# Patient Record
Sex: MALE | Race: WHITE | URBAN - METROPOLITAN AREA
[De-identification: names, ages, dates, MRNs, and addresses within clinical notes are randomized per-mention and may not be internally consistent; named-entity substitution may affect disease eponyms.]

---

## 2018-11-08 ENCOUNTER — HOSPITAL ENCOUNTER (EMERGENCY)
Facility: CLINIC | Age: 23
Discharge: HOME | End: 2018-11-08
Attending: EMERGENCY MEDICINE | Admitting: EMERGENCY MEDICINE
Payer: COMMERCIAL

## 2018-11-08 VITALS
SYSTOLIC BLOOD PRESSURE: 135 MMHG | BODY MASS INDEX: 23.19 KG/M2 | DIASTOLIC BLOOD PRESSURE: 72 MMHG | OXYGEN SATURATION: 98 % | RESPIRATION RATE: 15 BRPM | WEIGHT: 175 LBS | TEMPERATURE: 97.8 F | HEIGHT: 73 IN

## 2018-11-08 DIAGNOSIS — E16.0 HYPOGLYCEMIC REACTION TO INSULIN: ICD-10-CM

## 2018-11-08 DIAGNOSIS — T38.3X5A HYPOGLYCEMIC REACTION TO INSULIN: ICD-10-CM

## 2018-11-08 LAB
ALBUMIN UR-MCNC: NEGATIVE MG/DL
ANION GAP SERPL CALCULATED.3IONS-SCNC: 6 MMOL/L (ref 3–14)
APPEARANCE UR: CLEAR
BASOPHILS # BLD AUTO: 0 10E9/L (ref 0–0.2)
BASOPHILS NFR BLD AUTO: 0.1 %
BILIRUB UR QL STRIP: NEGATIVE
BUN SERPL-MCNC: 20 MG/DL (ref 7–30)
CALCIUM SERPL-MCNC: 9.7 MG/DL (ref 8.5–10.1)
CHLORIDE SERPL-SCNC: 101 MMOL/L (ref 94–109)
CO2 SERPL-SCNC: 29 MMOL/L (ref 20–32)
COLOR UR AUTO: ABNORMAL
CREAT SERPL-MCNC: 0.97 MG/DL (ref 0.66–1.25)
DIFFERENTIAL METHOD BLD: NORMAL
EOSINOPHIL # BLD AUTO: 0 10E9/L (ref 0–0.7)
EOSINOPHIL NFR BLD AUTO: 0.3 %
ERYTHROCYTE [DISTWIDTH] IN BLOOD BY AUTOMATED COUNT: 11.6 % (ref 10–15)
GFR SERPL CREATININE-BSD FRML MDRD: >90 ML/MIN/1.7M2
GLUCOSE BLDC GLUCOMTR-MCNC: 229 MG/DL (ref 70–99)
GLUCOSE BLDC GLUCOMTR-MCNC: 272 MG/DL (ref 70–99)
GLUCOSE BLDC GLUCOMTR-MCNC: 292 MG/DL (ref 70–99)
GLUCOSE BLDC GLUCOMTR-MCNC: 51 MG/DL (ref 70–99)
GLUCOSE BLDC GLUCOMTR-MCNC: 57 MG/DL (ref 70–99)
GLUCOSE SERPL-MCNC: 49 MG/DL (ref 70–99)
GLUCOSE UR STRIP-MCNC: 300 MG/DL
HCT VFR BLD AUTO: 47.4 % (ref 40–53)
HGB BLD-MCNC: 17.2 G/DL (ref 13.3–17.7)
HGB UR QL STRIP: NEGATIVE
IMM GRANULOCYTES # BLD: 0 10E9/L (ref 0–0.4)
IMM GRANULOCYTES NFR BLD: 0.2 %
KETONES UR STRIP-MCNC: NEGATIVE MG/DL
LEUKOCYTE ESTERASE UR QL STRIP: NEGATIVE
LYMPHOCYTES # BLD AUTO: 0.9 10E9/L (ref 0.8–5.3)
LYMPHOCYTES NFR BLD AUTO: 9.8 %
MCH RBC QN AUTO: 31.4 PG (ref 26.5–33)
MCHC RBC AUTO-ENTMCNC: 36.3 G/DL (ref 31.5–36.5)
MCV RBC AUTO: 87 FL (ref 78–100)
MONOCYTES # BLD AUTO: 0.8 10E9/L (ref 0–1.3)
MONOCYTES NFR BLD AUTO: 8.9 %
NEUTROPHILS # BLD AUTO: 7.6 10E9/L (ref 1.6–8.3)
NEUTROPHILS NFR BLD AUTO: 80.7 %
NITRATE UR QL: NEGATIVE
NRBC # BLD AUTO: 0 10*3/UL
NRBC BLD AUTO-RTO: 0 /100
PH UR STRIP: 7 PH (ref 5–7)
PLATELET # BLD AUTO: 255 10E9/L (ref 150–450)
POTASSIUM SERPL-SCNC: 3.3 MMOL/L (ref 3.4–5.3)
RBC # BLD AUTO: 5.48 10E12/L (ref 4.4–5.9)
SODIUM SERPL-SCNC: 136 MMOL/L (ref 133–144)
SOURCE: ABNORMAL
SP GR UR STRIP: 1.01 (ref 1–1.03)
UROBILINOGEN UR STRIP-MCNC: NORMAL MG/DL (ref 0–2)
WBC # BLD AUTO: 9.5 10E9/L (ref 4–11)

## 2018-11-08 PROCEDURE — 96374 THER/PROPH/DIAG INJ IV PUSH: CPT

## 2018-11-08 PROCEDURE — 99284 EMERGENCY DEPT VISIT MOD MDM: CPT | Mod: 25

## 2018-11-08 PROCEDURE — 96361 HYDRATE IV INFUSION ADD-ON: CPT

## 2018-11-08 PROCEDURE — 96376 TX/PRO/DX INJ SAME DRUG ADON: CPT

## 2018-11-08 PROCEDURE — 25800025 ZZH RX 258: Performed by: EMERGENCY MEDICINE

## 2018-11-08 PROCEDURE — 80048 BASIC METABOLIC PNL TOTAL CA: CPT | Performed by: EMERGENCY MEDICINE

## 2018-11-08 PROCEDURE — 25000128 H RX IP 250 OP 636: Performed by: EMERGENCY MEDICINE

## 2018-11-08 PROCEDURE — 00000146 ZZHCL STATISTIC GLUCOSE BY METER IP

## 2018-11-08 PROCEDURE — 85025 COMPLETE CBC W/AUTO DIFF WBC: CPT | Performed by: EMERGENCY MEDICINE

## 2018-11-08 PROCEDURE — 81003 URINALYSIS AUTO W/O SCOPE: CPT | Performed by: EMERGENCY MEDICINE

## 2018-11-08 PROCEDURE — 25000128 H RX IP 250 OP 636

## 2018-11-08 RX ORDER — ONDANSETRON 2 MG/ML
4 INJECTION INTRAMUSCULAR; INTRAVENOUS ONCE
Status: COMPLETED | OUTPATIENT
Start: 2018-11-08 | End: 2018-11-08

## 2018-11-08 RX ORDER — DEXTROSE MONOHYDRATE 25 G/50ML
50 INJECTION, SOLUTION INTRAVENOUS ONCE
Status: COMPLETED | OUTPATIENT
Start: 2018-11-08 | End: 2018-11-08

## 2018-11-08 RX ORDER — ONDANSETRON 4 MG/1
4 TABLET, ORALLY DISINTEGRATING ORAL EVERY 8 HOURS PRN
Qty: 15 TABLET | Refills: 0 | Status: SHIPPED | OUTPATIENT
Start: 2018-11-08 | End: 2018-11-11

## 2018-11-08 RX ORDER — ONDANSETRON 2 MG/ML
INJECTION INTRAMUSCULAR; INTRAVENOUS
Status: COMPLETED
Start: 2018-11-08 | End: 2018-11-08

## 2018-11-08 RX ADMIN — DEXTROSE MONOHYDRATE 50 ML: 25 INJECTION, SOLUTION INTRAVENOUS at 16:44

## 2018-11-08 RX ADMIN — SODIUM CHLORIDE 1000 ML: 9 INJECTION, SOLUTION INTRAVENOUS at 16:49

## 2018-11-08 RX ADMIN — ONDANSETRON 4 MG: 2 INJECTION INTRAMUSCULAR; INTRAVENOUS at 17:53

## 2018-11-08 RX ADMIN — ONDANSETRON 4 MG: 2 INJECTION INTRAMUSCULAR; INTRAVENOUS at 20:18

## 2018-11-08 ASSESSMENT — ENCOUNTER SYMPTOMS
HEADACHES: 0
NAUSEA: 1
CHILLS: 1
VOMITING: 1
FEVER: 0
DIAPHORESIS: 0
PALPITATIONS: 1
ABDOMINAL PAIN: 0
TREMORS: 1

## 2018-11-08 NOTE — ED AVS SNAPSHOT
Emergency Department    [address] [facility] [address]    Phone:  [phone]    Fax:  [phone]                                       Ronny Degroot   MRN: [id number]    Department:   Emergency Department   Date of Visit:  11/8/2018           Patient Information     Date Of Birth          1995        Your diagnoses for this visit were:     Hypoglycemic reaction to insulin        You were seen by Michele Lara MD.      Follow-up Information     Follow up with Endocrinologist. Schedule an appointment as soon as possible for a visit in 3 days.    Why:  For close follow up    Contact information:    Follow up with your endocrinologist        Go to  Emergency Department.    Specialty:  EMERGENCY MEDICINE    Why:  If symptoms worsen    Contact information:    [address] [facility] [address] [phone]        Discharge Instructions         Decrease your insulin back to your prior dosing (38 units of long acting at night, and 1 unit/10g of carbs of short acting with meals). Monitor your blood sugar closely. Keep the short acting the same and then slowly increase your long acting insulin one unit at a time (tomorrow increase to 39 units, if your sugars do not get too low then increase by 1 more the next day to 40 units). Follow up with your endocrinologist before increasing your short term insulin.    Hypoglycemia (Low Blood Sugar)     Fast-acting sugar includes a cup of nonfat milk.   Too little sugar (glucose) in your blood is called hypoglycemia or low blood sugar. Low blood sugar usually means anything lower than 70 mg/dL. Talk with your healthcare provider about your target range and what level is too low for you. Diabetes itself doesn t cause low blood sugar. But some of the treatments for diabetes, such as pills or insulin, may raise your risk for it. Low blood sugar may cause you to pass out or have a seizure. So always treat low blood sugar right away, but  don't overeat.  Special note: Always carry a source of fast-acting sugar and a snack in case of hypoglycemia.   What you may notice  If you have low blood sugar, you may have one or more of these symptoms:    Shakiness or dizziness    Cold, clammy skin or sweating    Feelings of hunger    Headache    Nervousness    A hard, fast heartbeat    Weakness    Confusion or irritability    Blurred vision    Having nightmares or waking up confused or sweating    Numbness or tingling in the lips or tongue  What you should do  Here are tips to follow if you have hypoglycemia:     First check your blood sugar. If it is too low (out of your target range), eat or drink 15 to 20 grams of fast-acting sugar. This may be 3 to 4 glucose tablets, 4 ounces (half a cup) of fruit juice or regular (nondiet) soda, 8 ounces (1 cup) of fat-free milk, or 1 tablespoon of honey. Don t take more than this, or your blood sugar may go too high.    Wait 15 minutes. Then recheck your blood sugar if you can.    If your blood sugar is still too low, repeat the steps above and check your blood sugar again. If your blood sugar still has not returned to your target range, contact your healthcare provider or seek emergency care.    Once your blood sugar returns to target range, eat a snack or meal.  Preventing low blood sugar  Things you can do include the following:     If your condition needs a strict treatment plan, eat your meals and snacks at the same times each day. Don t skip meals!    If your treatment plan lets you change when you eat and what you eat, learn how to change the time and dose of your rapid-acting insulin to match this.     Ask your healthcare provider if it is safe for you to drink alcohol. Never drink on an empty stomach.    Take your medicine at the prescribed times.    Always carry a source of fast-acting sugar and a snack when you re away from home.  Other things to do  Additional tips include the following:    Carry a medical ID  card, a compact [address], or wear a medical alert bracelet or necklace. It should say that you have diabetes. It should also say what to do if you pass out or have a seizure.    Make sure your family, friends, and coworkers know the signs of low blood sugar. Tell them what to do if your blood sugar falls very low and you can t treat yourself.    Keep a glucagon emergency kit handy. Be sure your family, friends, and coworkers know how and when to use it. Check it regularly and replace the glucagon before it expires.    Talk with your health care team about other things you can do to prevent low blood sugar.     If you have unexplained hypoglycemia or hypoglycemia several times, call your healthcare provider.   Date Last Reviewed: 5/1/2016 2000-2018 [employer]. [address] [place] [address], [place] [address]. All rights reserved. This information is not intended as a substitute for professional medical care. Always follow your healthcare professional's instructions.          24 Hour Appointment Hotline       To make an appointment at any [facility], call [phone] ([phone]). If you don't have a family doctor or clinic, we will help you find one. [place] clinics are conveniently located to serve the needs of you and your family.             Review of your medicines      START taking        Dose / Directions Last dose taken    ondansetron 4 MG ODT tab   Commonly known as:  ZOFRAN ODT   Dose:  4 mg   Quantity:  15 tablet        Take 1 tablet (4 mg) by mouth every 8 hours as needed for nausea or vomiting   Refills:  0          Our records show that you are taking the medicines listed below. If these are incorrect, please call your family doctor or clinic.        Dose / Directions Last dose taken    NOVOLOG SC        Refills:  0                Prescriptions were sent or printed at these locations (1 Prescription)                   Other Prescriptions                Printed at Department/Unit  printer (1 of 1)         ondansetron (ZOFRAN ODT) 4 MG ODT tab                Procedures and tests performed during your visit     Procedure/Test Number of Times Performed    Basic metabolic panel 1    CBC with platelets differential 1    Glucose by meter 5    Glucose monitor nursing POCT 1    Peripheral IV catheter 1    UA reflex to Microscopic and Culture 1      Orders Needing Specimen Collection     None      Pending Results     No orders found from 11/6/2018 to 11/9/2018.            Pending Culture Results     No orders found from 11/6/2018 to 11/9/2018.            Pending Results Instructions     If you had any lab results that were not finalized at the time of your Discharge, you can call the ED Lab Result RN at [phone]. You will be contacted by this team for any positive Lab results or changes in treatment. The nurses are available 7 days a week from 10A to 6:30P.  You can leave a message 24 hours per day and they will return your call.        Test Results From Your Hospital Stay        11/8/2018  4:54 PM      Component Results     Component Value Ref Range & Units Status    Glucose 51 (L) 70 - 99 mg/dL Final         11/8/2018  5:06 PM      Component Results     Component Value Ref Range & Units Status    WBC 9.5 4.0 - 11.0 10e9/L Final    RBC Count 5.48 4.4 - 5.9 10e12/L Final    Hemoglobin 17.2 13.3 - 17.7 g/dL Final    Hematocrit 47.4 40.0 - 53.0 % Final    MCV 87 78 - 100 fl Final    MCH 31.4 26.5 - 33.0 pg Final    MCHC 36.3 31.5 - 36.5 g/dL Final    RDW 11.6 10.0 - 15.0 % Final    Platelet Count 255 150 - 450 10e9/L Final    Diff Method Automated Method  Final    % Neutrophils 80.7 % Final    % Lymphocytes 9.8 % Final    % Monocytes 8.9 % Final    % Eosinophils 0.3 % Final    % Basophils 0.1 % Final    % Immature Granulocytes 0.2 % Final    Nucleated RBCs 0 0 /100 Final    Absolute Neutrophil 7.6 1.6 - 8.3 10e9/L Final    Absolute Lymphocytes 0.9 0.8 - 5.3 10e9/L Final    Absolute Monocytes 0.8 0.0  - 1.3 10e9/L Final    Absolute Eosinophils 0.0 0.0 - 0.7 10e9/L Final    Absolute Basophils 0.0 0.0 - 0.2 10e9/L Final    Abs Immature Granulocytes 0.0 0 - 0.4 10e9/L Final    Absolute Nucleated RBC 0.0  Final         11/8/2018  5:31 PM      Component Results     Component Value Ref Range & Units Status    Sodium 136 133 - 144 mmol/L Final    Potassium 3.3 (L) 3.4 - 5.3 mmol/L Final    Chloride 101 94 - 109 mmol/L Final    Carbon Dioxide 29 20 - 32 mmol/L Final    Anion Gap 6 3 - 14 mmol/L Final    Glucose 49 (LL) 70 - 99 mg/dL Final    Critical Value called to and read back by  LEONIDES BECK ON ER AT 1726 AN      Urea Nitrogen 20 7 - 30 mg/dL Final    Creatinine 0.97 0.66 - 1.25 mg/dL Final    GFR Estimate >90 >60 mL/min/1.7m2 Final    Non  GFR Calc    GFR Estimate If Black >90 >60 mL/min/1.7m2 Final    African American GFR Calc    Calcium 9.7 8.5 - 10.1 mg/dL Final         11/8/2018  5:26 PM      Component Results     Component Value Ref Range & Units Status    Color Urine Straw  Final    Appearance Urine Clear  Final    Glucose Urine 300 (A) NEG^Negative mg/dL Final    Bilirubin Urine Negative NEG^Negative Final    Ketones Urine Negative NEG^Negative mg/dL Final    Specific Gravity Urine 1.007 1.003 - 1.035 Final    Blood Urine Negative NEG^Negative Final    pH Urine 7.0 5.0 - 7.0 pH Final    Protein Albumin Urine Negative NEG^Negative mg/dL Final    Urobilinogen mg/dL Normal 0.0 - 2.0 mg/dL Final    Nitrite Urine Negative NEG^Negative Final    Leukocyte Esterase Urine Negative NEG^Negative Final    Source Midstream Urine  Final         11/8/2018  6:02 PM      Component Results     Component Value Ref Range & Units Status    Glucose 229 (H) 70 - 99 mg/dL Final         11/8/2018  6:36 PM      Component Results     Component Value Ref Range & Units Status    Glucose 272 (H) 70 - 99 mg/dL Final         11/8/2018  8:03 PM      Component Results     Component Value Ref Range & Units Status    Glucose 292  (H) 70 - 99 mg/dL Final         11/8/2018  8:12 PM      Component Results     Component Value Ref Range & Units Status    Glucose 57 (L) 70 - 99 mg/dL Final    Dr/RN Notified                Clinical Quality Measure: Blood Pressure Screening     Your blood pressure was checked while you were in the emergency department today. The last reading we obtained was  BP: 130/81 . Please read the guidelines below about what these numbers mean and what you should do about them.  If your systolic blood pressure (the top number) is less than 120 and your diastolic blood pressure (the bottom number) is less than 80, then your blood pressure is normal. There is nothing more that you need to do about it.  If your systolic blood pressure (the top number) is 120-139 or your diastolic blood pressure (the bottom number) is 80-89, your blood pressure may be higher than it should be. You should have your blood pressure rechecked within a year by a primary care provider.  If your systolic blood pressure (the top number) is 140 or greater or your diastolic blood pressure (the bottom number) is 90 or greater, you may have high blood pressure. High blood pressure is treatable, but if left untreated over time it can put you at risk for heart attack, stroke, or kidney failure. You should have your blood pressure rechecked by a primary care provider within the next 4 weeks.  If your provider in the emergency department today gave you specific instructions to follow-up with your doctor or provider even sooner than that, you should follow that instruction and not wait for up to 4 weeks for your follow-up visit.        Thank you for choosing [facility]       Thank you for choosing [facility] for your care. Our goal is always to provide you with excellent care. Hearing back from our patients is one way we can continue to improve our services. Please take a few minutes to complete the written survey that you may receive in the mail after you visit with  "us. Thank you!        [employer] Information     [employer] lets you send messages to your doctor, view your test results, renew your prescriptions, schedule appointments and more. To sign up, go to [email or web address] . Click on \"Log in\" on the left side of the screen, which will take you to the Welcome page. Then click on \"Sign up Now\" on the right side of the page.     You will be asked to enter the access code listed below, as well as some personal information. Please follow the directions to create your username and password.     Your access code is: [email or web address]  Expires: 2019  8:32 PM     Your access code will  in 90 days. If you need help or a new code, please call your [facility] clinic or [phone].        Care EveryWhere ID     This is your Care EveryWhere ID. This could be used by other organizations to access your [facility] medical records  [id number]        Equal Access to Services     [facility] Center[facility]ENCE : Hadii grady mosleyo Soglo, waaxda luqadaha, qaybta kaalmada ademaryyabishop, mehreen weston . So [facility] [phone].    ATENCIÓN: Si habla español, tiene a qiu disposición servicios gratuitos de asistencia lingüística. Llame al [phone].    We comply with applicable federal civil rights laws and [place] laws. We do not discriminate on the basis of race, color, national origin, age, disability, sex, sexual orientation, or gender identity.            After Visit Summary       This is your record. Keep this with you and show to your community pharmacist(s) and doctor(s) at your next visit.                  "

## 2018-11-08 NOTE — ED AVS SNAPSHOT
Emergency Department    [address] [place] [address]    Phone:  [phone]    Fax:  [phone]                                       Ronny Degroot   MRN: [id number]    Department:   Emergency Department   Date of Visit:  11/8/2018           After Visit Summary Signature Page     I have received my discharge instructions, and my questions have been answered. I have discussed any challenges I see with this plan with the nurse or doctor.    ..........................................................................................................................................  Patient/Patient Representative Signature      ..........................................................................................................................................  Patient Representative Print Name and Relationship to Patient    ..................................................               ................................................  Date                                   Time    ..........................................................................................................................................  Reviewed by Signature/Title    ...................................................              ..............................................  Date                                               Time          22EPIC Rev 08/18

## 2018-11-08 NOTE — ED PROVIDER NOTES
"  History     Chief Complaint:  Hypoglycemia    HPI   Ronny Degroot is a 23 year old male with a history of type 1 diabetes who presents with hypoglycemia following a recent change in his insulin dose. The patient states that a couple days ago his Novolog dose was changed from 1 unit per 10 g of carbs to 1 unit per 7 g of carbs and his Tresiba dose was changed to 44 units at night from 38 units. Since then he had noted more frequent low blood glucose levels so he lowered his PM Tresiba dose to 40 units at night. Today he has had 15 units of his Novolog between noon and 1300 with lunch and reports hypoglycemia with symptoms of tremors, chills, palpitations, nausea and vomiting. He tried to drink multiple sugary beverages but soon vomited after drinking them. He denies any sweats, headaches, vision changes, fevers or abdominal pain.  The patient has had a URI and conjunctivitis in the last month but denies any recent fevers. He has been in DKA once previously. He denies seizure or LOC.    Allergies:  No known allergies     Medications:    Novolog  Tresiba    Past Medical History:    Diabetes    Past Surgical History:    The patient does not have any pertinent past surgical history.    Family History:    No past pertinent family history.    Social History:  Patient presents with mother  Negative for tobacco use.  Negative for alcohol use.  Marital Status:  Single      Review of Systems   Constitutional: Positive for chills. Negative for diaphoresis and fever.   Eyes: Negative for visual disturbance.   Cardiovascular: Positive for palpitations.   Gastrointestinal: Positive for nausea and vomiting. Negative for abdominal pain.   Neurological: Positive for tremors. Negative for headaches.   All other systems reviewed and are negative.      Physical Exam   First Vitals:  BP: 152/81  Heart Rate: 112  Temp: 97.8  F (36.6  C)  Resp: 20  Height: 185.4 cm (6' 1\")  Weight: 79.4 kg (175 lb)  SpO2: 100 %      Physical " Exam  General: Patient appears uncomfortable, tremulous.   Head:  Scalp, face, and head appear normal  Eyes:  Pupils are equal, round, and reactive to light    Conjunctivae non-injected and sclerae white  ENT:    The external nose is normal    Pinnae are normal    The oropharynx is normal, mucous membranes moist    Uvula is in the midline  Neck:  Normal range of motion    There is no rigidity noted    Trachea is in the midline  CV:  Tachycardic rate, regular rhythm      Normal S1/S2, no S3/S4    No murmur or rub  Resp:  Lungs are clear and equal bilaterally    There is no tachypnea    No increased work of breathing    No rales, wheezing, or rhonchi  GI:  Abdomen is soft, no rigidity or guarding    No distension, or mass    No tenderness or rebound tenderness   MS:  Normal muscular tone    Symmetric motor strength    No lower extremity edema  Skin:  No rash or acute skin lesions noted. Mild diaphoresis.  Neuro: Awake and alert, oriented x 3. Strength grossly intact.    Speech is normal and fluent    Moves all extremities spontaneously  Psych:  Anxious affect.  Appropriate interactions.     Emergency Department Course   Laboratory:  UA with micro: Glucose: 300 o/w negative  1638 - Glucose by meter: 51 (L)  1701 - Glucose: 229 (H)  1823 - Glucose 272 (H)  1951 - Glucose: 292 (H)  CBC: WBC: 9.5, HGB: 17.2, PLT: 255  BMP: Glucose 45 (LL), Potassium: 3.3 (L), o/w WNL (Creatinine: 0.97)    Interventions:  1644 - 50% Dextrose 50 mL IV  1644 - NS 1L IV  1753 - Zofran 4 mg IV    Emergency Department Course:  Nursing notes and vitals reviewed.  1640: I performed an exam of the patient as documented above.     1832: I rechecked the patient. Explained findings to patient.    2013: I rechecked the patient. Findings and plan explained to the Patient. Patient discharged home with instructions regarding supportive care, medications, and reasons to return. The importance of close follow-up was reviewed.     Impression & Plan     Medical Decision Making:  Ronny Degroot is a 23 year old male who presents for evaluation of vomiting and tremors and low blood glucose in the setting of recent increase in his insulin dose.  This is consistent with hypoglycemia 2/2 insulin reaction.  The most likely etiology of the hypoglycemia is his recent increase in his daily insulin dose, but nonetheless a broad differential was considered including infection, overdose of medication, other metabolic derangement, lack of adequate PO intake, etc.  The patient is on short and long-acting insulin.  They are not on oral diabetes medications.  Renal function normal. Patient was given PO juice but continued with low glucose therefore 1 amp of D50 given with increase in his glucose and resolution of his symptoms. He was monitored in the ED for several hours without any recurrent hypoglycemia. I recommended he return to his previous dosing of insulin for today after discharge and then to slowly increase his PM long acting insulin by one unit per day (to a max of 44 units) and monitor his glucose carefully for any lows, he should stop increasing and go back down if he notes any low readings. His insulin regimen was adjusted because of poor control.  I would not increase his short acting until he follows up with his endocrinologist.  Discussed in detail with patient about this problem and my recommendations for management in the future.  Return precautions were discussed with patient. The patient's questions were answered and the patient was agreeable with discharge.        Diagnosis:    ICD-10-CM    1. Hypoglycemic reaction to insulin E16.0     T38.3X5A        Disposition:  discharged to home    New Prescriptions    ONDANSETRON (ZOFRAN ODT) 4 MG ODT TAB    Take 1 tablet (4 mg) by mouth every 8 hours as needed for nausea or vomiting         Michele MITCHELL, am serving as a scribe on 11/8/2018 at 4:40 PM to personally document services performed by Michele Lara MD  based on my observations and the provider's statements to me.     Michele Gambino  11/8/2018    EMERGENCY DEPARTMENT       Michele Lara MD  11/09/18 0376

## 2018-11-09 NOTE — ED NOTES
"Assumed care of pt at this time.    Report: BG to be rechecked at 1930. Pt resting in bed. Pt states that he is still slightly nauseated.    Current Vitals: /81  Temp 97.8  F (36.6  C) (Oral)  Resp 15  Ht 1.854 m (6' 1\")  Wt 79.4 kg (175 lb)  SpO2 97%  BMI 23.09 kg/m2    Disposition: Possible discharge            "

## 2018-11-09 NOTE — DISCHARGE INSTRUCTIONS
Decrease your insulin back to your prior dosing (38 units of long acting at night, and 1 unit/10g of carbs of short acting with meals). Monitor your blood sugar closely. Keep the short acting the same and then slowly increase your long acting insulin one unit at a time (tomorrow increase to 39 units, if your sugars do not get too low then increase by 1 more the next day to 40 units). Follow up with your endocrinologist before increasing your short term insulin.    Hypoglycemia (Low Blood Sugar)     Fast-acting sugar includes a cup of nonfat milk.   Too little sugar (glucose) in your blood is called hypoglycemia or low blood sugar. Low blood sugar usually means anything lower than 70 mg/dL. Talk with your healthcare provider about your target range and what level is too low for you. Diabetes itself doesn t cause low blood sugar. But some of the treatments for diabetes, such as pills or insulin, may raise your risk for it. Low blood sugar may cause you to pass out or have a seizure. So always treat low blood sugar right away, but don't overeat.  Special note: Always carry a source of fast-acting sugar and a snack in case of hypoglycemia.   What you may notice  If you have low blood sugar, you may have one or more of these symptoms:    Shakiness or dizziness    Cold, clammy skin or sweating    Feelings of hunger    Headache    Nervousness    A hard, fast heartbeat    Weakness    Confusion or irritability    Blurred vision    Having nightmares or waking up confused or sweating    Numbness or tingling in the lips or tongue  What you should do  Here are tips to follow if you have hypoglycemia:     First check your blood sugar. If it is too low (out of your target range), eat or drink 15 to 20 grams of fast-acting sugar. This may be 3 to 4 glucose tablets, 4 ounces (half a cup) of fruit juice or regular (nondiet) soda, 8 ounces (1 cup) of fat-free milk, or 1 tablespoon of honey. Don t take more than this, or your blood  sugar may go too high.    Wait 15 minutes. Then recheck your blood sugar if you can.    If your blood sugar is still too low, repeat the steps above and check your blood sugar again. If your blood sugar still has not returned to your target range, contact your healthcare provider or seek emergency care.    Once your blood sugar returns to target range, eat a snack or meal.  Preventing low blood sugar  Things you can do include the following:     If your condition needs a strict treatment plan, eat your meals and snacks at the same times each day. Don t skip meals!    If your treatment plan lets you change when you eat and what you eat, learn how to change the time and dose of your rapid-acting insulin to match this.     Ask your healthcare provider if it is safe for you to drink alcohol. Never drink on an empty stomach.    Take your medicine at the prescribed times.    Always carry a source of fast-acting sugar and a snack when you re away from home.  Other things to do  Additional tips include the following:    Carry a medical ID card, a compact USB drive, or wear a medical alert bracelet or necklace. It should say that you have diabetes. It should also say what to do if you pass out or have a seizure.    Make sure your family, friends, and coworkers know the signs of low blood sugar. Tell them what to do if your blood sugar falls very low and you can t treat yourself.    Keep a glucagon emergency kit handy. Be sure your family, friends, and coworkers know how and when to use it. Check it regularly and replace the glucagon before it expires.    Talk with your health care team about other things you can do to prevent low blood sugar.     If you have unexplained hypoglycemia or hypoglycemia several times, call your healthcare provider.   Date Last Reviewed: 5/1/2016 2000-2018 [employer]. [address] [place] [address], [place] [address]. All rights reserved. This information is not intended as a substitute  for professional medical care. Always follow your healthcare professional's instructions.